# Patient Record
Sex: MALE | Race: WHITE | Employment: UNEMPLOYED | URBAN - METROPOLITAN AREA
[De-identification: names, ages, dates, MRNs, and addresses within clinical notes are randomized per-mention and may not be internally consistent; named-entity substitution may affect disease eponyms.]

---

## 2019-08-12 ENCOUNTER — TELEPHONE (OUTPATIENT)
Dept: GASTROENTEROLOGY | Facility: AMBULARY SURGERY CENTER | Age: 59
End: 2019-08-12

## 2019-08-12 NOTE — TELEPHONE ENCOUNTER
NEW PT    Pt called requesting a sooner appt for rectal bleeding at [facility]

## 2019-10-14 NOTE — PROGRESS NOTES
[facility] - Outpatient Consultation  Gala Alpers 64 y o  male MRN: [id number]  Encounter: [id number]          ASSESSMENT AND PLAN:      1  Rectal bleeding  2  Diarrhea, unspecified type  3  Rectal urgency  4  Abdominal cramping    Given the acuity of the patient's diarrhea, rectal bleeding and urgency, I have ordered stool infectious studies including enteric bacterial panel, C diff and ova and parasite exam I will also check a fecal calprotectin, FOBT and CBC, celiac panel today  He has never had a prior colonoscopy  We will schedule this procedure in our outpatient endoscopy center  I instructed him to cease using prune juice to have additional bowel movements if he is already having diarrhea  The differential diagnosis also includes inflammatory bowel disease  If the above workup is negative, his symptoms may be due to diarrhea predominant IBS, given his history of anxiety and symptoms relief after a bowel movement  He may benefit from a tricyclic anti-depressant medication to help with abdominal spasms and rectal urgency going forward  - Stool Enteric Bacterial Panel by PCR; Future  - Ova and parasite examination; Future  - Clostridium difficile toxin by PCR; Future  - CBC and differential; Future  - Calprotectin,Fecal; Future  - Occult Blood, Fecal Immunochemical; Future  - Celiac Disease Antibody Profile; Future  - Colonoscopy; Future      Return to office after procedures and laboratory workup    ______________________________________________________________________    HPI:  Gala Alpers is a 63 yo male with history of anxiety and borderline personality disorder who presents today for evaluation of abdominal discomfort, diarrhea and rectal urgency  He states that 2 months ago he began having a sensation of "incomplete" emptying of his rectal vault and rectal urgency  He has been having bowel movements every 2 hours  The bowel movements are watery and non-bloody  He reports some lower abdominal cramping discomfort prior to the bowel movement which is relieved after he moves his bowel  He otherwise denies abdominal pain  He sees intermittent bright red blood per rectum in the bowel and when he wipes  He denies sick contacts, recent antibiotic exposure, or new foods  He has been drinking prune juice because "he feels like he needs to empty his bowels" despite having diarrhea already  He notes rectal urgency but when he goes to the toilet "nothing comes out"  He has no prior GI surgeries  He denies family history of colorectal cancer or other gi diseases  He smokes marijuana and cigarettes and denies alcohol use  Prior EGD/colonoscopy: none    REVIEW OF SYSTEMS:    CONSTITUTIONAL: Denies any fever, chills, rigors, and weight loss  HEENT: No earache or tinnitus  Denies hearing loss or visual disturbances  CARDIOVASCULAR: No chest pain or palpitations  RESPIRATORY: Denies any cough, hemoptysis, shortness of breath or dyspnea on exertion  GASTROINTESTINAL: As noted in the History of Present Illness  GENITOURINARY: No problems with urination  Denies any hematuria or dysuria  NEUROLOGIC: No dizziness or vertigo, denies headaches  MUSCULOSKELETAL: Denies any muscle or joint pain  SKIN: Denies skin rashes or itching  ENDOCRINE: Denies excessive thirst  Denies intolerance to heat or cold  PSYCHOSOCIAL: Denies depression  Endorses anxiety  Denies any recent memory loss         Historical Information   Past Medical History:   Diagnosis Date    Alcoholism ([facility] )     Allergic rhinitis, seasonal     Borderline personality disorder (HCC)     Cannabis dependence ([facility] )     Disorder of dental prosthesis     Facial weakness     Generalized anxiety disorder     Headache     Hyperlipidemia     Lumbago     Malignant neoplasm of tonsil (HCC)     Mixed hyperlipidemia     Osteoarthritis     Other cervical disc degeneration, cervicothoracic region     Pain in left shoulder     Post-traumatic stress disorder, unspecified     Presbyopia     Vitamin D deficiency      History reviewed  No pertinent surgical history  Social History   Social History     Substance and Sexual Activity   Alcohol Use Not on file     Social History     Substance and Sexual Activity   Drug Use Not on file     Social History     Tobacco Use   Smoking Status Not on file     Family History   Problem Relation Age of Onset    Breast cancer Mother     Pancreatic cancer Father     Breast cancer Sister     Pancreatic cancer Maternal Aunt     Colon cancer Neg Hx     Liver disease Neg Hx        Meds/Allergies     No current outpatient medications on file  No Known Allergies        Objective     Blood pressure 111/72, pulse 73, temperature (!) 96 8 °F (36 °C), temperature source Tympanic, weight 74 4 kg (164 lb)  There is no height or weight on file to calculate BMI  PHYSICAL EXAM:      General Appearance:   Alert, cooperative, no distress   HEENT:   Normocephalic, atraumatic, anicteric  Neck:  Supple, symmetrical, trachea midline   Lungs:   Clear to auscultation bilaterally; no rales, rhonchi or wheezing; respirations unlabored    Heart[de-identified]   Regular rate and rhythm; no murmur, rub, or gallop  Abdomen:   Soft, non-tender, non-distended; normal bowel sounds; no masses, no organomegaly    Genitalia:   Deferred    Rectal:   Deferred    Extremities:  No cyanosis, clubbing or edema    Pulses:  2+ and symmetric    Skin:  No jaundice, rashes, or lesions    Lymph nodes:  No palpable cervical lymphadenopathy        Lab Results:   No visits with results within 1 Day(s) from this visit  Latest known visit with results is:   No results found for any previous visit  Radiology Results:   No results found

## 2019-10-16 ENCOUNTER — TELEPHONE (OUTPATIENT)
Dept: GASTROENTEROLOGY | Facility: CLINIC | Age: 59
End: 2019-10-16

## 2019-10-16 ENCOUNTER — OFFICE VISIT (OUTPATIENT)
Dept: GASTROENTEROLOGY | Facility: MEDICAL CENTER | Age: 59
End: 2019-10-16
Payer: COMMERCIAL

## 2019-10-16 ENCOUNTER — TELEPHONE (OUTPATIENT)
Dept: GASTROENTEROLOGY | Facility: MEDICAL CENTER | Age: 59
End: 2019-10-16

## 2019-10-16 VITALS
WEIGHT: 164 LBS | HEART RATE: 73 BPM | DIASTOLIC BLOOD PRESSURE: 72 MMHG | TEMPERATURE: 96.8 F | SYSTOLIC BLOOD PRESSURE: 111 MMHG

## 2019-10-16 DIAGNOSIS — K62.5 RECTAL BLEEDING: Primary | ICD-10-CM

## 2019-10-16 DIAGNOSIS — R15.2 RECTAL URGENCY: ICD-10-CM

## 2019-10-16 DIAGNOSIS — R10.9 ABDOMINAL CRAMPING: ICD-10-CM

## 2019-10-16 DIAGNOSIS — R19.7 DIARRHEA, UNSPECIFIED TYPE: ICD-10-CM

## 2019-10-16 PROCEDURE — 99203 OFFICE O/P NEW LOW 30 MIN: CPT | Performed by: INTERNAL MEDICINE

## 2019-10-16 NOTE — TELEPHONE ENCOUNTER
Left pt a vm letting him know golshubhamly will be sent to pt in mail by [facility] and to ask for an email to send the new instructions

## 2019-10-16 NOTE — PATIENT INSTRUCTIONS
The patient is scheduled on 10/21/19 for a colon with DR Jayne Cason at [facility]  Miralax/Dulcolax prep was gone over with by the MA  He is aware he will be called on Friday with an arrival time and that he needs a

## 2019-10-16 NOTE — TELEPHONE ENCOUNTER
Patients GI provider:  Dr King Linda    Number to return call: fax # [phone]    Reason for call: [facility] called asking if a script for pt's prep can be faxed to the above number so they can get the prep out to pt     Scheduled procedure/appointment date if applicable: Procedure - 10/21/19

## 2019-10-16 NOTE — TELEPHONE ENCOUNTER
Sp[facility] to [place] [facility]  she provided me with the auth number and the exp date she stated the [facility] ref will be faxed the effective date had to be change due to the patient changing the date if the [facility] Ref isnt faxed call [facility] at [phone]   Per sawyer the patient will be covered for his visit today 10/16/2019

## 2019-10-21 ENCOUNTER — HOSPITAL ENCOUNTER (OUTPATIENT)
Dept: GASTROENTEROLOGY | Facility: MEDICAL CENTER | Age: 59
Setting detail: OUTPATIENT SURGERY
Discharge: HOME | End: 2019-10-21

## 2019-10-21 ENCOUNTER — TELEPHONE (OUTPATIENT)
Dept: GASTROENTEROLOGY | Facility: CLINIC | Age: 59
End: 2019-10-21

## 2019-10-21 RX ORDER — SODIUM CHLORIDE 9 MG/ML
125 INJECTION, SOLUTION INTRAVENOUS CONTINUOUS
Status: CANCELLED | OUTPATIENT
Start: 2019-10-21

## 2019-10-21 NOTE — TELEPHONE ENCOUNTER
Patients GI provider:  Dr Brigitte Garland    Number to return call: ([phone]    Reason for call: Pt calling to r/s his procedure    Scheduled procedure/appointment date if applicable: Apt/procedure - today but was cancelled

## 2020-02-19 ENCOUNTER — OFFICE VISIT (OUTPATIENT)
Dept: GASTROENTEROLOGY | Facility: CLINIC | Age: 60
End: 2020-02-19

## 2020-02-19 VITALS
SYSTOLIC BLOOD PRESSURE: 138 MMHG | BODY MASS INDEX: 22.81 KG/M2 | HEIGHT: 69 IN | HEART RATE: 102 BPM | DIASTOLIC BLOOD PRESSURE: 98 MMHG | WEIGHT: 154 LBS

## 2020-02-19 DIAGNOSIS — K62.5 RECTAL BLEEDING: ICD-10-CM

## 2020-02-19 DIAGNOSIS — R19.4 CHANGE IN BOWEL HABITS: ICD-10-CM

## 2020-02-19 DIAGNOSIS — F10.29 ALCOHOL DEPENDENCE WITH UNSPECIFIED ALCOHOL-INDUCED DISORDER (HCC): ICD-10-CM

## 2020-02-19 DIAGNOSIS — R21 RASH: Primary | ICD-10-CM

## 2020-02-19 PROBLEM — F10.20 ALCOHOL DEPENDENCE (HCC): Status: ACTIVE | Noted: 2020-02-19

## 2020-02-19 PROCEDURE — 99204 OFFICE O/P NEW MOD 45 MIN: CPT | Performed by: NURSE PRACTITIONER

## 2020-02-19 RX ORDER — MUPIROCIN CALCIUM 20 MG/G
CREAM TOPICAL 3 TIMES DAILY
Qty: 15 G | Refills: 0 | Status: SHIPPED | OUTPATIENT
Start: 2020-02-19

## 2020-02-19 NOTE — PROGRESS NOTES
[facility] - Outpatient Consultation  Marylou Gosselin 64 y o  male MRN: [id number]  Encounter: [id number]    ASSESSMENT AND PLAN:      1  Rectal bleeding  One episode of rectal bleeding that occurred a few months ago following a formed bowel movement  Likely hemorrhoidal in nature  Unable to exclude colon polyps or colo-rectal neoplasm as he has never undergone a screening colonoscopy  I could not appreciate internal and external hemorrhoids on today's rectal examination    - CBC and differential; Future  - increase fluid and fiber  - daily fiber supplementation  - arrange for a colonoscopy in a hospital setting    2  Change in bowel habits  Patient tells me he is having change in bowel habits characterized by more frequent loose stools with rectal urgency and occasional diarrhea  This has been ongoing for months  Stable  Suspect alcohol-induced  Will exclude infectious etiology, celiac or thyroid dysregulation  IBS is a strong consideration     - Comprehensive metabolic panel; Future  - Sedimentation rate, automated; Future  - Stool Enteric Bacterial Panel by PCR; Future  - TSH + Free T4; Future  - Celiac Disease Antibody Profile; Future  - Clostridium difficile toxin by PCR; Future    3  Alcohol dependence with unspecified alcohol-induced disorder ([facility] )  Longstanding history of alcohol dependence for which he underwent multiple visits to inpatient rehabilitation and obtained multiple DUIs  He admits to drinking approximately 30 beers daily  - discussed the importance of alcohol cessation with the patient  I also told the patient that he cannot drink for at least 24-48 hours prior to colonoscopy as this could compromise his respiratory status during the colonoscopy    4  Rash  Chronic rash on his lower back, bilateral buttocks and upper thighs  Appear to be follicular macules/papules and follicular erythematous papules    Infrequently itchy and denies actual pain   The history of this is unclear and he believes he was given an appointment for this in the past and possibly had a biopsy  He is unable to recall where this occurred  Will type try to obtain a copy of note/events that transpired from his primary care physician     - mupirocin (BACTROBAN) 2 % cream; Apply topically 3 (three) times a day  Dispense: 15 g; Refill: 0  - please obtain records from the Wallace [facility] PA-C      Followup Appointment:  3 months  ______________________________________________________________________    Chief Complaint   Patient presents with    Rectal Bleeding     Needs colon reordered    Constipation       HPI:   Javier Brandt is a 64y o  year old male who presents with episode of rectal bleeding that occurred a few months ago  He tells me that this was noted in the toilet bowl following a normal bowel movement  However when questioning him further he believes he may have been constipated at the time  The patient is a poor historian and therefore it is difficult to ascertain an adequate history  Now tells me he is experiencing more frequent stools with occasional loose stools  This has been ongoing for least a couple of months  Denies night wakening  Admits a 10 lb weight loss over the past few months  Denies abdominal pain, nausea, vomiting or melena  Denies fevers or chills  He does admit to drinking at least 30 beers a day, his alcohol consumption has increased from approximately 02/20/2025 to at least 30 beers over the past couple months  Also complains of a rash that he noted around his anal area has spread to his bilateral buttocks, lower back and upper bilateral thighs  He believes this has been there for quite some time but cannot recall when it actually began  Occasional itching but denies actual pain  Denies any new medication    He believes that this was already evaluated and possibly biopsied in the past   He also reports he was given some type of cream that did not improve the rash at all  Historical Information   Past Medical History:   Diagnosis Date    Alcoholism ([facility] )     Allergic rhinitis, seasonal     Borderline personality disorder (HCC)     Cannabis dependence ([facility] )     Disorder of dental prosthesis     Facial weakness     Generalized anxiety disorder     Headache     Hyperlipidemia     Lumbago     Malignant neoplasm of tonsil (HCC)     Mixed hyperlipidemia     Osteoarthritis     Other cervical disc degeneration, cervicothoracic region     Pain in left shoulder     Post-traumatic stress disorder, unspecified     Presbyopia     Tonsillar mass     Vitamin D deficiency      Past Surgical History:   Procedure Laterality Date    TONSILLECTOMY  2017     status post radiation and chemotherapy in addition to removal     Social History     Substance and Sexual Activity   Alcohol Use Yes    Alcohol/week: 30 0 standard drinks    Types: 30 Cans of beer per week    Frequency: 4 or more times a week    Drinks per session: 10 or more    Binge frequency: Daily or almost daily     Social History     Substance and Sexual Activity   Drug Use Yes    Types: Marijuana    Comment: "a little"     Social History     Tobacco Use   Smoking Status Former Smoker   Smokeless Tobacco Never Used     Family History   Problem Relation Age of Onset    Breast cancer Mother     Pancreatic cancer Father     Breast cancer Sister     Pancreatic cancer Maternal Aunt     Colon cancer Neg Hx     Liver disease Neg Hx        Meds/Allergies     Current Outpatient Medications:     mupirocin (BACTROBAN) 2 % cream    polyethylene glycol (GOLYTELY) 4000 mL solution    No Known Allergies    PHYSICAL EXAM:    Blood pressure 138/98, pulse 102, height 5' 9" (1 753 m), weight 69 9 kg (154 lb)  Body mass index is 22 74 kg/m²  General Appearance: NAD, cooperative, alert, anxious  Eyes: Anicteric,   ENT:  Normocephalic, atraumatic, normal mucosa      Neck: Supple, symmetrical, trachea midline,   Resp:  Clear to auscultation bilaterally; no rales, rhonchi or wheezing; respirations unlabored   CV:  S1 S2, Regular rate and rhythm; no murmur, rub, or gallop  GI:  Soft, non-tender, non-distended; normal bowel sounds; no masses, no organomegaly   Rectal:  [place], heme-negative stool  I cannot palpate any internal hemorrhoids and no external hemorrhoids were evident  Musculoskeletal: No cyanosis, clubbing or edema  Normal ROM  Skin:  macules/papules and follicular erythematous papules, noted on bilateral buttocks, lower back and bilateral upper thighs  Heme/Lymph: No palpable cervical lymphadenopathy  Psych: Normal affect, good eye contact  Neuro: No gross deficits, AAOx3    Lab Results:   No results found for: WBC, HGB, HCT, MCV, PLT  No results found for: NA, K, CL, CO2, ANIONGAP, BUN, CREATININE, GLUCOSE, GLUF, CALCIUM, CORRECTEDCA, AST, ALT, ALKPHOS, PROT, BILITOT, EGFR  No results found for: IRON, TIBC, FERRITIN  No results found for: LIPASE    Radiology Results:   No results found  REVIEW OF SYSTEMS:    CONSTITUTIONAL: Denies any fever, chills, rigors, and weight loss  HEENT: No earache or tinnitus  Denies hearing loss or visual disturbances  CARDIOVASCULAR: No palpitations  Admits to chest pain with exertion  RESPIRATORY: Denies any cough, hemoptysis, shortness of breath  Admits to shortness of breath with exertion  GASTROINTESTINAL: As noted in the History of Present Illness  GENITOURINARY: No problems with urination  Denies any hematuria or dysuria  NEUROLOGIC: No dizziness or vertigo, denies headaches  MUSCULOSKELETAL: Denies any muscle or joint pain  SKIN:  Admits to rash and discoloration  ENDOCRINE: Denies excessive thirst  Denies intolerance to heat or cold  PSYCHOSOCIAL:  Admits to insomnia, depression and anxiety  Denies any recent memory loss

## 2020-02-20 DIAGNOSIS — R19.8 CHANGE IN BOWEL FUNCTION: Primary | ICD-10-CM

## 2020-02-21 ENCOUNTER — TELEPHONE (OUTPATIENT)
Dept: GASTROENTEROLOGY | Facility: CLINIC | Age: 60
End: 2020-02-21

## 2020-03-04 NOTE — TELEPHONE ENCOUNTER
I called patient left a detailed vm regarding stool studies  I requested he call me back  Did he submit them? What lab facility? If we do not hear back from him we may need to cancel procedure as we need to ensure he does not have an infection

## 2020-03-09 ENCOUNTER — TELEPHONE (OUTPATIENT)
Dept: GASTROENTEROLOGY | Facility: CLINIC | Age: 60
End: 2020-03-09

## 2020-04-24 ENCOUNTER — TELEPHONE (OUTPATIENT)
Dept: GASTROENTEROLOGY | Facility: CLINIC | Age: 60
End: 2020-04-24

## 2020-07-08 ENCOUNTER — TELEPHONE (OUTPATIENT)
Dept: GASTROENTEROLOGY | Facility: CLINIC | Age: 60
End: 2020-07-08

## 2020-09-23 NOTE — TELEPHONE ENCOUNTER
Alicia-pt has been contacted to reschedule cancelled colon ordered from last ov with no response-please advise   Thank you